# Patient Record
(demographics unavailable — no encounter records)

---

## 2024-10-22 NOTE — HISTORY OF PRESENT ILLNESS
[FreeTextEntry1] : 77 y/o male from [facility] with history of mitral valve replacement (bioprosthetic, Galarza) and tricuspid repair in 2017 in [place], as per patient no CABG, no PCI, visiting form [place], who presents for initial evaluation prior to non-cardiac surgery (hernia repair). Patient reports no chest pain, dyspnea, orthopnea or PND. ET is over 4 METs. ECG revealed NSR with RBBB, no ischemic changes. He does have a holosystolic murmur on exam. Patient follows for cardiology and primary care in [place], [place]. He was diagnosed with umbilical hernia and is planned for surgical repair. Cardiac pre-op evaluation and clearance for holding Coumadin for 2 days pre-op is being requested.

## 2024-10-22 NOTE — ASSESSMENT
[FreeTextEntry1] : 77 y/o male with history as above  Valvular disease S/p bioprosthetic MVR, TVR, aortic sclerosis as of last echo w/o stenosis. Preserved S2 sound on exam consistent with no evidence of hemodynamically significant AS. Schedule 2D echo to evaluate I called his PMD, Dr. Fam in [place] - their office will fax the prior echo.  ET over 4 METs No history of obstructive CAD No ACC/AHA high risk predictors Overall intermediate risk for intermediate risk surgery No active cardiac contraindications. Hold Coumadin for 2 days pre-op as requested. C/w BB perioperatively (bisoprolol)  Unclear rational for long term anticoagulation, recommend follow-up with his cardiologist in [place] to discuss. Will be available to address any cardiac issues, while he is in [place]. Information for [place] office provided.

## 2024-10-22 NOTE — PHYSICAL EXAM

## 2024-10-22 NOTE — PHYSICAL EXAM
[Normal Breath Sounds] : Normal breath sounds [No Rash or Lesion] : No rash or lesion [Alert] : alert [Calm] : calm [JVD] : no jugular venous distention  [de-identified] : Healthy [de-identified] : Normal [de-identified] : Mildly protuberant abdomen, mild rectus diastases [de-identified] : Normal testicles [de-identified] : Left inguinal hernia, reducible; umbilical hernia, reducible

## 2024-10-22 NOTE — ASSESSMENT
[FreeTextEntry1] : Dinora is a pleasant 78-year-old  and retired [place] gentleman with a past medical history significant for hypertension, hypercholesterolemia, valvular replacement in 2017 on Coumadin and baby aspirin, BPH and gout who presents to the office with recent pain and swelling in the periumbilical region and left groin causing significant concern.  His symptoms worsened yesterday prompting a visit to the emergency room where a CT scan was done confirming these 2 hernias resulting in surgical evaluation today.  He travels often between here in [facility] and is occasionally carrying heavy suitcases.  He would like to travel to [place] in the near future.  Physical examination demonstrates a large tender plum sized bulge in the left groin which is easily reducible consistent with a large symptomatic protruding small bowel containing left inguinal hernia warranting surgical repair.  There is no evidence of incarceration or strangulation, and the patient denies any symptoms of obstruction.  Examination of his right groin demonstrates a mild weakness but no evidence of hernia recurrence.  His right groin hernia was repaired several years ago in [place].  Both testicles are normal.  His umbilical examination demonstrates a strawberry size tender reducible bulge consistent with a symptomatic protruding umbilical hernia which deserves concomitant repair.  He does have a mild rectus diastasis likely related to his excess abdominal weight and mildly protuberant abdomen.  His current BMI is 29.  I was able to review both the report and images from his recent CT scan of the abdomen and pelvis and it correlates with his physical examination today.  Of note, it did demonstrate an infrarenal short segment abdominal aortic aneurysm of 3.1 cm.  I explained the pros and cons of surgery, as well as all risks, benefits, indications and alternatives of the procedure and the patient understood and agreed.  He complicating factor is that his cardiologist has retired and he has not found a new one since then.  Dinora was scheduled for the repair of his left inguinal hernia with mesh and the repair of his umbilical hernia with mesh on Wednesday, November 13, 2024 under LOCAL with IV SEDATION at [facility] with presurgical testing preceding this date. He was encouraged to avoid heavy lifting and strenuous activity in the interim, of course.  He will need to hold his warfarin 2-3 days prior to surgery and he may resume it immediately after his procedure.  He may remain on his baby aspirin throughout the perioperative period.  A total of 45 minutes was spent on this encounter.

## 2024-10-22 NOTE — CONSULT LETTER
[Dear  ___] : Dear  [unfilled], [Courtesy Letter:] : I had the pleasure of seeing your patient, [unfilled], in my office today. [Please see my note below.] : Please see my note below. [Consult Closing:] : Thank you very much for allowing me to participate in the care of this patient.  If you have any questions, please do not hesitate to contact me. [FreeTextEntry3] : Respectfully,  Corbin Malik M.D., FACS